# Patient Record
Sex: FEMALE | Race: ASIAN | NOT HISPANIC OR LATINO | URBAN - METROPOLITAN AREA
[De-identification: names, ages, dates, MRNs, and addresses within clinical notes are randomized per-mention and may not be internally consistent; named-entity substitution may affect disease eponyms.]

---

## 2022-06-13 ENCOUNTER — OFFICE VISIT (OUTPATIENT)
Dept: FAMILY MEDICINE | Facility: CLINIC | Age: 62
End: 2022-06-13
Payer: COMMERCIAL

## 2022-06-13 VITALS
TEMPERATURE: 98.1 F | DIASTOLIC BLOOD PRESSURE: 73 MMHG | HEART RATE: 75 BPM | OXYGEN SATURATION: 98 % | SYSTOLIC BLOOD PRESSURE: 110 MMHG

## 2022-06-13 DIAGNOSIS — R30.0 DYSURIA: ICD-10-CM

## 2022-06-13 DIAGNOSIS — N39.0 URINARY TRACT INFECTION IN FEMALE: Primary | ICD-10-CM

## 2022-06-13 LAB
ALBUMIN UR-MCNC: NEGATIVE MG/DL
APPEARANCE UR: ABNORMAL
BILIRUB UR QL STRIP: NEGATIVE
COLOR UR AUTO: YELLOW
GLUCOSE UR STRIP-MCNC: NEGATIVE MG/DL
HGB UR QL STRIP: ABNORMAL
KETONES UR STRIP-MCNC: NEGATIVE MG/DL
LEUKOCYTE ESTERASE UR QL STRIP: ABNORMAL
NITRATE UR QL: NEGATIVE
PH UR STRIP: 5 [PH] (ref 5–8)
SP GR UR STRIP: <=1.005 (ref 1–1.03)
UROBILINOGEN UR STRIP-ACNC: 0.2 E.U./DL

## 2022-06-13 PROCEDURE — 99203 OFFICE O/P NEW LOW 30 MIN: CPT | Performed by: NURSE PRACTITIONER

## 2022-06-13 PROCEDURE — 81003 URINALYSIS AUTO W/O SCOPE: CPT | Performed by: NURSE PRACTITIONER

## 2022-06-13 PROCEDURE — 87086 URINE CULTURE/COLONY COUNT: CPT | Performed by: NURSE PRACTITIONER

## 2022-06-13 RX ORDER — LISINOPRIL AND HYDROCHLOROTHIAZIDE 12.5; 2 MG/1; MG/1
1 TABLET ORAL DAILY
COMMUNITY
Start: 2022-05-19

## 2022-06-13 RX ORDER — PHENAZOPYRIDINE HYDROCHLORIDE 200 MG/1
200 TABLET, FILM COATED ORAL 3 TIMES DAILY PRN
Qty: 6 TABLET | Refills: 0 | Status: SHIPPED | OUTPATIENT
Start: 2022-06-13 | End: 2022-06-15

## 2022-06-13 RX ORDER — NITROFURANTOIN 25; 75 MG/1; MG/1
100 CAPSULE ORAL 2 TIMES DAILY
Qty: 10 CAPSULE | Refills: 0 | Status: SHIPPED | OUTPATIENT
Start: 2022-06-13 | End: 2022-06-18

## 2022-06-13 ASSESSMENT — ENCOUNTER SYMPTOMS
FLANK PAIN: 0
FEVER: 0
CHILLS: 0

## 2022-06-13 NOTE — PATIENT INSTRUCTIONS
Macrobid for infection    Pyridium for pain - will turn urine orange.      Come back if not better in 2-3 days

## 2022-06-13 NOTE — PROGRESS NOTES
Assessment & Plan     Dysuria    - UA macro with reflex to Microscopic and Culture - Clinc Collect  - Urine Culture    Urinary tract infection in female    - nitroFURantoin macrocrystal-monohydrate (MACROBID) 100 MG capsule  Dispense: 10 capsule; Refill: 0  - phenazopyridine (PYRIDIUM) 200 MG tablet  Dispense: 6 tablet; Refill: 0     History and UA consistent with UTI.  Push fluids.  Return in 3 days if no better, sooner if worsening.    Did not give enough urine for microscopic, but I did see the urine before it was sent to the lab and it was quite cloudy with typical UTI symptoms.  Urine culture in process.    Recheck in 3 days and not better.  Did explain Pyridium orange urine side effect.  Has chronic left lower back pain, but no CVA tenderness today.            Return in about 3 days (around 6/16/2022) for If no better.    Marie Hatch [facility] SAYRA Galicia is a 62 year old female who presents to clinic today for the following health issues:  Chief Complaint   Patient presents with     Urinary Problem     Hard to urinate x few days; dysuria     HPI    Urinary symptoms starting 7 days ago.    Has had UTI x 1.      New patient to [place].      Denies history of kidney issues in the past.      Symptoms Include:   Dysuria before and after urination, frequency [x]    Due to language barrier, an  was present during the history-taking and subsequent discussion (and for part of the physical exam) with this patient.      Denies:  Flank pain, fevers     History includes: Some chronic left lower back pain for years.  Unchanged.  No new back pain.      Review of Systems   Constitutional: Negative for chills and fever.   Genitourinary: Negative for flank pain.           Objective    /73 (BP Location: Right arm, Patient Position: Chair, Cuff Size: Adult Regular)   Pulse 75   Temp 98.1  F (36.7  C) (Tympanic)   SpO2 98%   Physical Exam  Constitutional:        General: She is not in acute distress.     Appearance: She is well-developed.   Eyes:      General:         Right eye: No discharge.         Left eye: No discharge.      Conjunctiva/sclera: Conjunctivae normal.   Pulmonary:      Effort: Pulmonary effort is normal.   Abdominal:      Tenderness: There is no right CVA tenderness or left CVA tenderness.   Musculoskeletal:         General: Normal range of motion.   Skin:     General: Skin is warm and dry.      Capillary Refill: Capillary refill takes less than 2 seconds.   Neurological:      Mental Status: She is alert and oriented to person, place, and time.   Psychiatric:         Mood and Affect: Mood normal.         Behavior: Behavior normal.         Thought Content: Thought content normal.         Judgment: Judgment normal.            Results for orders placed or performed in visit on 06/13/22 (from the past 24 hour(s))   UA macro with reflex to Microscopic and Culture - Clinc Collect    Specimen: Urine, Clean Catch   Result Value Ref Range    Color Urine Yellow Colorless, Straw, Light Yellow, Yellow    Appearance Urine Slightly Cloudy (A) Clear    Glucose Urine Negative Negative mg/dL    Bilirubin Urine Negative Negative    Ketones Urine Negative Negative mg/dL    Specific Gravity Urine <=1.005 1.005 - 1.030    Blood Urine Small (A) Negative    pH Urine 5.0 5.0 - 8.0    Protein Albumin Urine Negative Negative mg/dL    Urobilinogen Urine 0.2 0.2, 1.0 E.U./dL    Nitrite Urine Negative Negative    Leukocyte Esterase Urine Moderate (A) Negative

## 2022-06-15 LAB — BACTERIA UR CULT: NORMAL

## 2022-06-16 ENCOUNTER — OFFICE VISIT (OUTPATIENT)
Dept: FAMILY MEDICINE | Facility: CLINIC | Age: 62
End: 2022-06-16
Payer: COMMERCIAL

## 2022-06-16 ENCOUNTER — HOSPITAL ENCOUNTER (OUTPATIENT)
Dept: CT IMAGING | Facility: HOSPITAL | Age: 62
Discharge: HOME | End: 2022-06-16
Attending: NURSE PRACTITIONER | Admitting: NURSE PRACTITIONER
Payer: COMMERCIAL

## 2022-06-16 VITALS
HEART RATE: 81 BPM | OXYGEN SATURATION: 97 % | DIASTOLIC BLOOD PRESSURE: 80 MMHG | SYSTOLIC BLOOD PRESSURE: 124 MMHG | TEMPERATURE: 97.6 F

## 2022-06-16 DIAGNOSIS — R30.0 DYSURIA: ICD-10-CM

## 2022-06-16 DIAGNOSIS — H93.12 TINNITUS, LEFT: ICD-10-CM

## 2022-06-16 DIAGNOSIS — R10.32 LLQ ABDOMINAL PAIN: ICD-10-CM

## 2022-06-16 DIAGNOSIS — N30.00 ACUTE CYSTITIS WITHOUT HEMATURIA: Primary | ICD-10-CM

## 2022-06-16 LAB
ALBUMIN UR-MCNC: NEGATIVE MG/DL
ALBUMIN UR-MCNC: NEGATIVE MG/DL
APPEARANCE UR: ABNORMAL
APPEARANCE UR: CLEAR
BACTERIA #/AREA URNS HPF: ABNORMAL /HPF
BILIRUB UR QL STRIP: NEGATIVE
BILIRUB UR QL STRIP: NEGATIVE
CLUE CELLS: ABNORMAL
COLOR UR AUTO: YELLOW
COLOR UR AUTO: YELLOW
GLUCOSE UR STRIP-MCNC: NEGATIVE MG/DL
GLUCOSE UR STRIP-MCNC: NEGATIVE MG/DL
HGB UR QL STRIP: NEGATIVE
HGB UR QL STRIP: NEGATIVE
KETONES UR STRIP-MCNC: NEGATIVE MG/DL
KETONES UR STRIP-MCNC: NEGATIVE MG/DL
LEUKOCYTE ESTERASE UR QL STRIP: ABNORMAL
LEUKOCYTE ESTERASE UR QL STRIP: NEGATIVE
NITRATE UR QL: POSITIVE
NITRATE UR QL: POSITIVE
PH UR STRIP: 5 [PH] (ref 5–8)
PH UR STRIP: 5 [PH] (ref 5–8)
RBC #/AREA URNS AUTO: ABNORMAL /HPF
SP GR UR STRIP: 1.01 (ref 1–1.03)
SP GR UR STRIP: 1.01 (ref 1–1.03)
SQUAMOUS #/AREA URNS AUTO: ABNORMAL /LPF
TRANS CELLS #/AREA URNS HPF: ABNORMAL /HPF
TRICHOMONAS, WET PREP: ABNORMAL
UROBILINOGEN UR STRIP-ACNC: 0.2 E.U./DL
UROBILINOGEN UR STRIP-ACNC: 0.2 E.U./DL
WBC #/AREA URNS AUTO: ABNORMAL /HPF
WBC'S/HIGH POWER FIELD, WET PREP: ABNORMAL
YEAST, WET PREP: ABNORMAL

## 2022-06-16 PROCEDURE — 87086 URINE CULTURE/COLONY COUNT: CPT | Performed by: NURSE PRACTITIONER

## 2022-06-16 PROCEDURE — 250N000011 HC RX IP 250 OP 636: Performed by: NURSE PRACTITIONER

## 2022-06-16 PROCEDURE — 74177 CT ABD & PELVIS W/CONTRAST: CPT

## 2022-06-16 PROCEDURE — 81003 URINALYSIS AUTO W/O SCOPE: CPT | Mod: 59 | Performed by: NURSE PRACTITIONER

## 2022-06-16 PROCEDURE — 81001 URINALYSIS AUTO W/SCOPE: CPT | Performed by: NURSE PRACTITIONER

## 2022-06-16 PROCEDURE — 87210 SMEAR WET MOUNT SALINE/INK: CPT | Performed by: NURSE PRACTITIONER

## 2022-06-16 PROCEDURE — 99214 OFFICE O/P EST MOD 30 MIN: CPT | Performed by: NURSE PRACTITIONER

## 2022-06-16 RX ORDER — SULFAMETHOXAZOLE/TRIMETHOPRIM 800-160 MG
1 TABLET ORAL 2 TIMES DAILY
Qty: 10 TABLET | Refills: 0 | Status: SHIPPED | OUTPATIENT
Start: 2022-06-16 | End: 2022-06-21

## 2022-06-16 RX ORDER — IOPAMIDOL 755 MG/ML
100 INJECTION, SOLUTION INTRAVASCULAR ONCE
Status: COMPLETED | OUTPATIENT
Start: 2022-06-16 | End: 2022-06-16

## 2022-06-16 RX ADMIN — IOPAMIDOL 100 ML: 755 INJECTION, SOLUTION INTRAVENOUS at 19:26

## 2022-06-16 ASSESSMENT — ENCOUNTER SYMPTOMS
FEVER: 0
DYSURIA: 1
ABDOMINAL PAIN: 1
DIARRHEA: 1
APPETITE CHANGE: 1
VOMITING: 0

## 2022-06-16 NOTE — PROGRESS NOTES
Assessment & Plan     Dysuria    - UA macro with reflex to Microscopic and Culture - Clinc Collect  - Wet prep  - Urine Culture  - UA macro with reflex to Microscopic and Culture - Clinc Collect  - Urine Microscopic Exam  - Urine Culture Aerobic Bacterial    Tinnitus, left    - Adult Audiology  Referral    LLQ abdominal pain    - CT Abdomen Pelvis w Contrast    Acute cystitis without hematuria    - sulfamethoxazole-trimethoprim (BACTRIM DS) 800-160 MG tablet  Dispense: 10 tablet; Refill: 0     Patient with ongoing dysuria associated with left side pain.     UA done on the 13th does not show infection on the culture.  The 13th sample and today's sample are both insufficient for microscopic.  This time we will have her repeat the UA for microscopic as it is positive for nitrites.     Mild left-sided abdominal pain was associated with diarrhea which has resolved.  Ongoing for 2 weeks.      Wet prep neg    Urine now shows nitrites with 5-10 WBC.  We will try empiric treatment with Bactrim.  Recheck if no better with PCP, sooner if worsening.  If culture is negative, could be vulvovaginal atrophy.     CT to check for diverticulitis or similar pathology with ongoing left lower quadrant abdominal pain was essentially negative with possible mild colitis.    Addressed subacute tinnitus - audiology check.    38 minutes spent on the date of the encounter doing chart review, review of test results, patient visit and documentation         Return in about 2 days (around 6/18/2022).    Marie Hatch [facility] SAYRA Galicia is a 62 year old female who presents to clinic today for the following health issues:  Chief Complaint   Patient presents with     RECHECK     Follow up painful urination.  Was seen 6/13.  Some improvement, but pain continues.  Mistakenly took abx TID instead of BID and completed yesterday.      HPI    Patient was seen and treated empirically for UTI with  Macrobid on 613.  Culture grew greater than 100,000 mixed urogenital bobby with possible contamination.  Symptoms continue.  Took antibiotics 3 times a day instead of twice a day and finished them early.     Symptoms Include: Left lower quadrant 3 out of 10 abdominal pain also associated with diarrhea at onset for the last 2 weeks.  Feels like burning and bloating - chronic issues, worse in the last 2 weeks, happening almost every day.  Can't sleep on left side.  Diarrhea has improved.  Is the same.        Dysuria following urination and discomfort in the vaginal area.      Denies:  Fevers, back pain, blood in the urine.  Abnormal sensation in the abdomen started around the same time as the urine issue.    History includes: Ringing in the ear on left for 2 months.        Review of Systems   Constitutional: Positive for appetite change (Decreased). Negative for fever.   Gastrointestinal: Positive for abdominal pain and diarrhea. Negative for vomiting.   Genitourinary: Positive for dysuria.           Objective    /80 (BP Location: Right arm, Patient Position: Sitting, Cuff Size: Adult Regular)   Pulse 81   Temp 97.6  F (36.4  C) (Tympanic)   SpO2 97%   Physical Exam  Constitutional:       General: She is not in acute distress.     Appearance: She is well-developed.   Eyes:      General:         Right eye: No discharge.         Left eye: No discharge.      Conjunctiva/sclera: Conjunctivae normal.   Pulmonary:      Effort: Pulmonary effort is normal.   Abdominal:      General: Bowel sounds are normal.      Palpations: Abdomen is soft.      Tenderness: There is abdominal tenderness (Left mid to upper quadrant laterally). There is no right CVA tenderness or left CVA tenderness.   Musculoskeletal:         General: Normal range of motion.   Skin:     General: Skin is warm and dry.      Capillary Refill: Capillary refill takes less than 2 seconds.   Neurological:      Mental Status: She is alert and oriented to  person, place, and time.   Psychiatric:         Behavior: Behavior normal.         Thought Content: Thought content normal.         Judgment: Judgment normal.        Results for orders placed or performed during the hospital encounter of 06/16/22   CT Abdomen Pelvis w Contrast     Status: None    Narrative    EXAM: CT ABDOMEN PELVIS W CONTRAST  LOCATION: [facility]  DATE/TIME: 6/16/2022 7:23 PM    INDICATION: Diverticulitis suspected. Left lower quadrant pain and dysuria.  COMPARISON: None.  TECHNIQUE: CT scan of the abdomen and pelvis was performed following injection of IV contrast. Multiplanar reformats were obtained. Dose reduction techniques were used.  CONTRAST: 100 ml Isovue 370    FINDINGS:   LOWER CHEST: Minimal linear scarring.    HEPATOBILIARY: Mild fatty infiltration of liver.    PANCREAS: Normal.    SPLEEN: Normal.    ADRENAL GLANDS: Normal.    KIDNEYS/BLADDER: Normal kidneys. No stones or hydronephrosis. No abnormal renal enhancement.  Minimal wall thickening involving bladder.    BOWEL: No obstruction or inflammatory change. Left hemicolon from mid transverse to sigmoid segment collapsed and empty with no convincing inflammatory wall thickening. Right hemicolon, sigmoid colon and rectum are unremarkable.    LYMPH NODES: Normal.    VASCULATURE: Unremarkable.    PELVIC ORGANS: No adnexal lesions. No free fluid.    MUSCULOSKELETAL: Normal.      Impression    IMPRESSION:   1.  No diverticulitis. There is collapsed and empty portion of left hemicolon from mid transverse to sigmoid segments favored to be due to normal peristalsis though can't exclude mild segmental colitis.  2.  Mild bladder wall thickening. Remainder of urinary tract system negative.  3.  Fatty infiltration of liver.   Results for orders placed or performed in visit on 06/16/22   UA macro with reflex to Microscopic and Culture - Clinc Collect     Status: Abnormal    Specimen: Urine, Clean Catch   Result Value Ref  Range    Color Urine Yellow Colorless, Straw, Light Yellow, Yellow    Appearance Urine Clear Clear    Glucose Urine Negative Negative mg/dL    Bilirubin Urine Negative Negative    Ketones Urine Negative Negative mg/dL    Specific Gravity Urine 1.010 1.005 - 1.030    Blood Urine Negative Negative    pH Urine 5.0 5.0 - 8.0    Protein Albumin Urine Negative Negative mg/dL    Urobilinogen Urine 0.2 0.2, 1.0 E.U./dL    Nitrite Urine Positive (A) Negative    Leukocyte Esterase Urine Negative Negative   UA macro with reflex to Microscopic and Culture - Clinc Collect     Status: Abnormal    Specimen: Urine, Midstream   Result Value Ref Range    Color Urine Yellow Colorless, Straw, Light Yellow, Yellow    Appearance Urine Slightly Cloudy (A) Clear    Glucose Urine Negative Negative mg/dL    Bilirubin Urine Negative Negative    Ketones Urine Negative Negative mg/dL    Specific Gravity Urine 1.010 1.005 - 1.030    Blood Urine Negative Negative    pH Urine 5.0 5.0 - 8.0    Protein Albumin Urine Negative Negative mg/dL    Urobilinogen Urine 0.2 0.2, 1.0 E.U./dL    Nitrite Urine Positive (A) Negative    Leukocyte Esterase Urine Trace (A) Negative   Urine Microscopic Exam     Status: Abnormal   Result Value Ref Range    Bacteria Urine Few (A) None Seen /HPF    RBC Urine 0-2 0-2 /HPF /HPF    WBC Urine 5-10 (A) 0-5 /HPF /HPF    Squamous Epithelials Urine Moderate (A) None Seen /LPF    Transitional Epithelials Urine Few (A) None Seen /HPF   Wet prep     Status: Abnormal    Specimen: Vagina; Swab   Result Value Ref Range    Trichomonas Absent Absent    Yeast Absent Absent    Clue Cells Absent Absent    WBCs/high power field 3+ (A) None   Urine Culture     Status: None    Specimen: Urine, Clean Catch   Result Value Ref Range    Culture <10,000 CFU/mL Mixture of urogenital bobby

## 2022-06-17 NOTE — PATIENT INSTRUCTIONS
Start new antibiotic Bactrim.      Recheck in 2 days if not better    Call for hearing appointment for ringing.

## 2022-06-18 LAB — BACTERIA UR CULT: NORMAL

## 2022-07-27 ENCOUNTER — OFFICE VISIT (OUTPATIENT)
Dept: AUDIOLOGY | Facility: CLINIC | Age: 62
End: 2022-07-27
Attending: NURSE PRACTITIONER
Payer: COMMERCIAL

## 2022-07-27 ENCOUNTER — OFFICE VISIT (OUTPATIENT)
Dept: OTOLARYNGOLOGY | Facility: CLINIC | Age: 62
End: 2022-07-27
Payer: COMMERCIAL

## 2022-07-27 DIAGNOSIS — H93.12 TINNITUS, LEFT: ICD-10-CM

## 2022-07-27 DIAGNOSIS — H90.A21 SENSORINEURAL HEARING LOSS (SNHL) OF RIGHT EAR WITH RESTRICTED HEARING OF LEFT EAR: Primary | ICD-10-CM

## 2022-07-27 DIAGNOSIS — H90.A32 MIXED CONDUCTIVE AND SENSORINEURAL HEARING LOSS OF LEFT EAR WITH RESTRICTED HEARING OF RIGHT EAR: ICD-10-CM

## 2022-07-27 DIAGNOSIS — R42 POSTURAL LIGHTHEADEDNESS: ICD-10-CM

## 2022-07-27 PROCEDURE — 99243 OFF/OP CNSLTJ NEW/EST LOW 30: CPT | Performed by: OTOLARYNGOLOGY

## 2022-07-27 PROCEDURE — 92557 COMPREHENSIVE HEARING TEST: CPT | Performed by: AUDIOLOGIST

## 2022-07-27 PROCEDURE — 92567 TYMPANOMETRY: CPT | Performed by: AUDIOLOGIST

## 2022-07-27 PROCEDURE — 92565 STENGER TEST PURE TONE: CPT | Performed by: AUDIOLOGIST

## 2022-07-27 NOTE — LETTER
7/27/2022         RE: Grayson Guerrero  [address] [place] [address]        Dear Colleague,    Thank you for referring your patient, Grayson Guerrero, to the [facility]. Please see a copy of my visit note below.    HPI: This patient is a 61yo F who presents for evaluation of hearing at the request of Marie Hatch CNP. There has been a gradual loss of hearing over the past few years in both ears. Denies otalgia, otorrhea, and other major medical issues. Has been around moderate noise and has no relevant  family history. There is bilateral, non-pulsatile tinnitus, most noticeable when it is quiet. States that she has postural lightheadedness, not true vertigo, happens a few times per week. The feeling resolves when she sits down.     Past medical history, surgical history, social history, family history, medications, and allergies have been reviewed with the patient and are documented above.    Review of Systems: a 10-system review was performed. Pertinent positives are noted in the HPI and on a separate scanned document in the chart.    PHYSICAL EXAMINATION:  GEN: no acute distress, normocephalic  EYES: extraocular movements are intact, pupils are equal and round. Sclera clear.   EARS: auricles are normally formed. The external auditory canals are clear with minimal to no cerumen. Tympanic membranes are intact bilaterally with no signs of infection, effusion, retractions, or perforations.  NOSE: anterior nares are patent. There are no masses or lesions. The septum is non-obstructing.  OC/OP: clear, dentition is in good repair. The tongue and palate are fully mobile and symmetric. No masses or lesions.  NECK: soft and supple. No lymphadenopathy or masses. Airway is midline.  NEURO: CN VII and XII symmetric. alert and oriented. No spontaneous nystagmus. Gait is normal.  PULM: breathing comfortably on room air, normal chest expansion with respiration  CARDS: no cyanosis or clubbing, normal  carotid pulses    AUDIOGRAM: mild to severe R SNHL, moderate to profound MHL left. Poor WRS bilaterally. Type As tymps bilaterally    MEDICAL DECISION-MAKING: This patient is a 63yo F with right sensorineural hearing loss and left mixed loss. Discussed hearing protection. Medically clear for hearing aids should the patient desire them.         Again, thank you for allowing me to participate in the care of your patient.        Sincerely,        Aruna Crouch MD

## 2022-07-27 NOTE — PROGRESS NOTES
AUDIOLOGY REPORT    SUMMARY: Audiology visit completed. Grayson Guerrero was accompanied by an  at the visit. See audiogram for results.     Chrissy: negative.    RECOMMENDATIONS: Follow-up with ENT.    Oliver Lam, [facility]-A  [place] Licensed Audiologist #[id number] '

## 2022-07-27 NOTE — PROGRESS NOTES
HPI: This patient is a 61yo F who presents for evaluation of hearing at the request of Marie Hatch CNP. There has been a gradual loss of hearing over the past few years in both ears. Denies otalgia, otorrhea, and other major medical issues. Has been around moderate noise and has no relevant  family history. There is bilateral, non-pulsatile tinnitus, most noticeable when it is quiet. States that she has postural lightheadedness, not true vertigo, happens a few times per week. The feeling resolves when she sits down.     Past medical history, surgical history, social history, family history, medications, and allergies have been reviewed with the patient and are documented above.    Review of Systems: a 10-system review was performed. Pertinent positives are noted in the HPI and on a separate scanned document in the chart.    PHYSICAL EXAMINATION:  GEN: no acute distress, normocephalic  EYES: extraocular movements are intact, pupils are equal and round. Sclera clear.   EARS: auricles are normally formed. The external auditory canals are clear with minimal to no cerumen. Tympanic membranes are intact bilaterally with no signs of infection, effusion, retractions, or perforations.  NOSE: anterior nares are patent. There are no masses or lesions. The septum is non-obstructing.  OC/OP: clear, dentition is in good repair. The tongue and palate are fully mobile and symmetric. No masses or lesions.  NECK: soft and supple. No lymphadenopathy or masses. Airway is midline.  NEURO: CN VII and XII symmetric. alert and oriented. No spontaneous nystagmus. Gait is normal.  PULM: breathing comfortably on room air, normal chest expansion with respiration  CARDS: no cyanosis or clubbing, normal carotid pulses    AUDIOGRAM: mild to severe R SNHL, moderate to profound MHL left. Poor WRS bilaterally. Type As tymps bilaterally    MEDICAL DECISION-MAKING: This patient is a 61yo F with right sensorineural hearing loss and left mixed  loss. Discussed hearing protection. Medically clear for hearing aids should the patient desire them.

## 2022-08-15 ENCOUNTER — OFFICE VISIT (OUTPATIENT)
Dept: AUDIOLOGY | Facility: CLINIC | Age: 62
End: 2022-08-15
Payer: COMMERCIAL

## 2022-08-15 DIAGNOSIS — H90.A21 SENSORINEURAL HEARING LOSS (SNHL) OF RIGHT EAR WITH RESTRICTED HEARING OF LEFT EAR: Primary | ICD-10-CM

## 2022-08-15 DIAGNOSIS — H90.A32 MIXED CONDUCTIVE AND SENSORINEURAL HEARING LOSS OF LEFT EAR WITH RESTRICTED HEARING OF RIGHT EAR: ICD-10-CM

## 2022-08-15 PROCEDURE — 92591 PR HEARING AID EXAM BINAURAL: CPT | Performed by: AUDIOLOGIST

## 2022-08-15 PROCEDURE — V5275 EAR IMPRESSION: HCPCS | Performed by: AUDIOLOGIST

## 2022-08-15 NOTE — PROGRESS NOTES
AUDIOLOGY REPORT    SUBJECTIVE: Grayson Guerrero is a 62 year old female was seen in the Audiology Clinic at  [facility] on 8/15/22 to discuss concerns with hearing and functional communication difficulties. The patient was accompanied by two interpreters at the visit. Grayson has been seen previously on 7/27/22, and results revealed mild to severe sensorineural hearing loss in the right ear and moderate to profound mixed hearing loss in the left ear.  The patient was medically evaluated and determined to be cleared for binaural hearing aids by Dr. Crouch on 7/27/22.     Grayson Guerrero seems to be very bothered by the tinnitus she experiences in the left ear.     OBJECTIVE:  Patient is a hearing aid candidate. Patient would like to move forward with a hearing aid evaluation today. Therefore, the patient was presented with different options for amplification to help aid in communication. Discussed styles, levels of technology and monaural vs. binaural fitting.     Grayson Guerrero is told that using hearing aids may make her tinnitus less noticeable when she is using the aids. She is told that hearing aids will be able to improve her awareness of sounds but they may not make speech 100% clear for her due to her poor word understanding.    The hearing aid(s) mutually chosen were:  Binaural: Phonak Audeo P70-R   COLOR: P8   BATTERY SIZE: rechargeable  EARMOLD/TIPS: acrylic cShell with canal lock  CANAL/ LENGTH: 0 moderate power    Otoscopy revealed ears are clear of cerumen bilaterally. Bilateral earmolds were taken without incident.    ASSESSMENT:   Reviewed purchase information and warranty information with patient. The 45 day trial period was explained to patient. The patient was given a copy of the [place] [employer] consumer brochure on purchasing hearing instruments. Patient risk factors have been provided to the patient in writing prior to the sale of the hearing aid per FDA regulation. The risk  factors are also available in the User Instructional Booklet to be presented on the day of the hearing aid fitting. Hearing aid(s) ordered. Hearing aid evaluation completed.    PLAN: Ba is scheduled to return on 9/26/22 for a hearing aid fitting and programming. Purchase agreement will be completed on that date. Please contact this clinic with any questions or concerns.      Isidro Lam., [facility]  [place] Licensed Audiologist #[id number]

## 2022-09-26 ENCOUNTER — OFFICE VISIT (OUTPATIENT)
Dept: AUDIOLOGY | Facility: CLINIC | Age: 62
End: 2022-09-26
Payer: COMMERCIAL

## 2022-09-26 DIAGNOSIS — H90.A21 SENSORINEURAL HEARING LOSS (SNHL) OF RIGHT EAR WITH RESTRICTED HEARING OF LEFT EAR: ICD-10-CM

## 2022-09-26 DIAGNOSIS — H90.A32 MIXED CONDUCTIVE AND SENSORINEURAL HEARING LOSS OF LEFT EAR WITH RESTRICTED HEARING OF RIGHT EAR: ICD-10-CM

## 2022-09-26 PROCEDURE — V5160 DISPENSING FEE BINAURAL: HCPCS | Performed by: AUDIOLOGIST

## 2022-09-26 PROCEDURE — V5264 EAR MOLD/INSERT: HCPCS | Mod: NU | Performed by: AUDIOLOGIST

## 2022-09-26 PROCEDURE — V5261 HEARING AID, DIGIT, BIN, BTE: HCPCS | Mod: NU | Performed by: AUDIOLOGIST

## 2022-09-26 PROCEDURE — V5020 CONFORMITY EVALUATION: HCPCS | Mod: RT | Performed by: AUDIOLOGIST

## 2022-09-26 PROCEDURE — 92593 PR HEARING AID CHECK, BINAURAL: CPT | Performed by: AUDIOLOGIST

## 2022-09-26 PROCEDURE — V5011 HEARING AID FITTING/CHECKING: HCPCS | Mod: LT | Performed by: AUDIOLOGIST

## 2022-09-26 PROCEDURE — V5020 CONFORMITY EVALUATION: HCPCS | Mod: LT | Performed by: AUDIOLOGIST

## 2022-09-26 PROCEDURE — V5011 HEARING AID FITTING/CHECKING: HCPCS | Mod: RT | Performed by: AUDIOLOGIST

## 2022-09-26 NOTE — PROGRESS NOTES
AUDIOLOGY REPORT    SUBJECTIVE: Grayson Guerrero, a 62 year old female, was seen in the Audiology Clinic at [facility] [place] [facility] today for the fitting of Binaural Phonak Audeo P70-R hearing aids. Grayson has been seen previously on 7/27/22, and results revealed mild to severe sensorineural hearing loss in the right ear and moderate to profound mixed hearing loss in the left ear.  The patient was medically evaluated and determined to be cleared for binaural hearing aids by Dr. Crouch on 7/27/22. Grayson Guerrero was accompanied by a family member and an  at the visit.    OBJECTIVE:  Prior to fitting, a hearing aid check was performed to ensure device functionality. The hearing aid conformity evaluation was completed.The hearing aids were placed and they provided a good fit. Real-ear-probe-microphone measurements were completed on the [employer] system and were a good match to NAL-NL1 target with soft sounds audible, moderate sounds comfortable, and loud sounds below discomfort. UCLs are verified through maximum power output measures and demonstrate appropriate limiting of loud inputs. Ms. Guerrero was oriented to proper hearing aid use, care, cleaning (no water, dry brush), batteries (rechargeable, low-battery signal), aid insertion/removal, user booklet, warranty information, storage cases, and other hearing aid details. The patient confirmed understanding of hearing aid use and care, and showed proper insertion of hearing aid and batteries while in the office today. Ms. Guerrero reported good volume and sound quality today.    Speech Intelligibility Index (SII)  The speech intelligibility index (SII) estimates the amount of audible speech at different presentation levels through the hearing aids. The following SII values represent audibility for average inputs:  Unaided SII:    25(R); 4(L)  Aided SII:        67(R); 50(L)    The overall gain of the hearing aids is decreased to 70% for patient comfort. Occlusion compensation is  set to strong for patient comfort. Grayson Guerrero reports that she can hear a slight static sound after these changes are made. She is brought into the hearing steiner and reports she can no longer hear any static so it is determined that the static is likely the overhead fan in the room. Frequency lowering is enabled above 4000 Hz. The manual volume control is enabled and Grayson Guerrero is shown how to use it.     Grayson Guerrero will need to be shown how to change the wax traps at her next visit. She can be told that the hearing aids can pair to a cell phone at her next visit as well.    EAR(S) FIT: Binaural  MA HEARING AID MAKE: Right: Phonak Audeo P70-R, black; Left: Phonak Audeo P70-R, black  MA HEARING AID MODEL #: Right: [id number]; Left: [id number]  HEARING AID STYLE: Right: ELEAZAR; Left: ELEAZAR   LENGTH: Right:  0M; Left:  0M  EARMOLDS: Right: cShell tan: [id number]; Left:  cShell tan: [id number]  SERIAL NUMBERS: Right: [id number]; Left: [id number]  WARRANTY END DATE: Right: 11/20/2025; Left:: 11/20/2025      CHARGES:   Earmold(s): , Qty 2, $160.00, NU (New), RT (Right) and LT (Left)  Hearing Aid Check: Binaural, 50951, $81.00  Dispensing Fee: Binaural, , $500.00  Fit/Orientation: Binaural, , $416.00  Hearing Aid Conformity Evaluation: 2, , $174.00RT, LT  Hearing Aid Digital: Binaural, BTE, ,NU (Binaural), $4,429.00  Total: $5,760.00       ASSESSMENT: Binaural Phonak Audeo P70-R hearing aid fitting completed today. Verification measures were performed. The 45 day trial period was explained to patient, and they expressed understanding. Ms. Guerrero signed the Hearing Aid Purchase Agreement and was given a copy, as well as details on her hearing aids. Patient was counseled that exact out of pocket amounts cannot be determined for hearing aid claims being sent to insurance. Any insurance coverage information presented to the patient is an estimate only, and is not a guarantee of payment. Patient has been advised to  check with their own insurance.    PLAN: Ms. Guerrero will return for follow-up in 3 weeks for a hearing aid review appointment. Please call this clinic with questions regarding today s appointment.      Oliver Lam, [facility]-A  [place] Licensed Audiologist #[id number]

## 2022-10-17 ENCOUNTER — OFFICE VISIT (OUTPATIENT)
Dept: AUDIOLOGY | Facility: CLINIC | Age: 62
End: 2022-10-17
Payer: COMMERCIAL

## 2022-10-17 DIAGNOSIS — H90.A32 MIXED CONDUCTIVE AND SENSORINEURAL HEARING LOSS OF LEFT EAR WITH RESTRICTED HEARING OF RIGHT EAR: Primary | ICD-10-CM

## 2022-10-17 DIAGNOSIS — H90.A21 SENSORINEURAL HEARING LOSS (SNHL) OF RIGHT EAR WITH RESTRICTED HEARING OF LEFT EAR: ICD-10-CM

## 2022-10-17 PROCEDURE — V5010 ASSESSMENT FOR HEARING AID: HCPCS | Performed by: AUDIOLOGIST

## 2022-10-17 NOTE — PROGRESS NOTES
AUDIOLOGY REPORT    SUBJECTIVE: Grayson Guerrero is a 62 year old female who was seen in the Audiology Clinic at the [facility] on 10/17/2022  for a follow-up check regarding the fitting of new hearing aids. 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 Grayson Guerrero was fit with a pair of Phonak Audeo P70-R hearing aids with cShell earmolds on 9/27/22. She is accompanied by an  at the visit.    Grayson Guerrero reports that she has a difficult time hearing when she wears both hearing aids together and that using both makes her feel pressure in her ears. She states that she typically only uses one hearing aid at a time and she switches between the right and left aid. Grayson Guerrero reports that she tried using both hearing aids together when she first got the hearing aids but she couldn't get used to them. Grayson Guerrero is interested in keeping both hearing aids. She states that she might be moving to [place] soon.    OBJECTIVE:   The hearing aids were connected to the computer. Data logging reveals that she is using the hearing aids for an average of 1.4 hours per day. Grayson Guerrero is counseled on the importance of increasing her hearing aid use in order to get more used to the sound with them in her ears.     The overall gain of the hearing aids is decreased by approximately 12 dB today. Grayson Guerrero wants the volume decreased further, but throughout the appointment she states that the volume is more comfortable at this new setting.     Reviewed 45 day trial period, care, cleaning (no water, dry brush), batteries (rechargeable), low-battery signal), aid insertion/removal, volume adjustment (if applicable), user booklet, warranty information, storage cases, and other hearing aid details.     Grayson Guerrero is shown how to change the hearing aid wax traps and she is able to do this successfully during the  visit today.    No charge visit today.     ASSESSMENT: A follow-up appointment for hearing aid fitting was completed today. The above changes were made to the hearing aids.    PLAN: Ba will return for follow-up as needed. She should return for a hearing test in 1 year. Please call this clinic with any questions regarding today s appointment.      Oliver Lam, [facility]  [place] Licensed Audiologist #[id number]